# Patient Record
Sex: MALE | URBAN - METROPOLITAN AREA
[De-identification: names, ages, dates, MRNs, and addresses within clinical notes are randomized per-mention and may not be internally consistent; named-entity substitution may affect disease eponyms.]

---

## 2023-08-16 ENCOUNTER — APPOINTMENT (OUTPATIENT)
Dept: URBAN - METROPOLITAN AREA CLINIC 314 | Age: 28
Setting detail: DERMATOLOGY
End: 2023-08-16

## 2023-08-16 DIAGNOSIS — L26 EXFOLIATIVE DERMATITIS: ICD-10-CM

## 2023-08-16 DIAGNOSIS — L21.8 OTHER SEBORRHEIC DERMATITIS: ICD-10-CM

## 2023-08-16 DIAGNOSIS — K13.0 DISEASES OF LIPS: ICD-10-CM

## 2023-08-16 PROCEDURE — OTHER PRESCRIPTION MEDICATION MANAGEMENT: OTHER

## 2023-08-16 PROCEDURE — OTHER COUNSELING: OTHER

## 2023-08-16 PROCEDURE — 99203 OFFICE O/P NEW LOW 30 MIN: CPT

## 2023-08-16 PROCEDURE — OTHER PRESCRIPTION: OTHER

## 2023-08-16 RX ORDER — PIMECROLIMUS 10 MG/G
CREAM TOPICAL
Qty: 60 | Refills: 3 | Status: ERX | COMMUNITY
Start: 2023-08-16

## 2023-08-16 RX ORDER — KETOCONAZOLE 20 MG/G
CREAM TOPICAL BID
Qty: 30 | Refills: 3 | Status: ERX | COMMUNITY
Start: 2023-08-16

## 2023-08-16 ASSESSMENT — LOCATION DETAILED DESCRIPTION DERM
LOCATION DETAILED: LEFT PLANTAR FOREFOOT OVERLYING 3RD METATARSAL
LOCATION DETAILED: RIGHT ORAL COMMISSURE
LOCATION DETAILED: RIGHT MEDIAL MALAR CHEEK
LOCATION DETAILED: RIGHT MEDIAL PLANTAR MIDFOOT
LOCATION DETAILED: GLABELLA
LOCATION DETAILED: LEFT MEDIAL MALAR CHEEK
LOCATION DETAILED: LEFT ORAL COMMISSURE

## 2023-08-16 ASSESSMENT — LOCATION SIMPLE DESCRIPTION DERM
LOCATION SIMPLE: GLABELLA
LOCATION SIMPLE: RIGHT CHEEK
LOCATION SIMPLE: LEFT CHEEK
LOCATION SIMPLE: LEFT PLANTAR SURFACE
LOCATION SIMPLE: LEFT LIP
LOCATION SIMPLE: RIGHT LIP
LOCATION SIMPLE: RIGHT PLANTAR SURFACE

## 2023-08-16 ASSESSMENT — LOCATION ZONE DERM
LOCATION ZONE: LIP
LOCATION ZONE: FACE
LOCATION ZONE: FEET

## 2023-08-16 NOTE — HPI: RASH
What Type Of Note Output Would You Prefer (Optional)?: Bullet Format
How Severe Is Your Rash?: moderate
Is This A New Presentation, Or A Follow-Up?: Rash
Additional History: Patient was prescribed ketoconazole. It is helpful.
Additional History: Patient was prescribed desonide. He does find that his lips have been cracking and drying worse.

## 2023-08-16 NOTE — PROCEDURE: PRESCRIPTION MEDICATION MANAGEMENT
Render In Strict Bullet Format?: No
Detail Level: Zone
Plan: -\\n Discussed with patient there is no clear evidence of correlation with diet and condition but avoid pro-inflammatory foods. \\nPatient was counseled stress can be correlated with flares. Counseled patient on consideration of a non-steroidal prescription
Continue Regimen: Continue the following treatments: -\\nKetoconazole 2% shampoo\\nPlan: -\\nPatient was counseled shampoo can be used on scalp, ears, face and beard area. Recommended patient let product sit on skin for a minute before rinsing off.
Initiate Treatment: Elidel BID to rashy areas of face until clear
Discontinue Regimen: Desonide 0.5% ointment PRN flares (start: Elidel)
Initiate Treatment: -\\nDesonide 0.5% ointment (has Rx at home)\\n\\nMix w/ ketoconazole 2 % topical cream and apply BID PRN flares
Plan: -\\nPatient was counseled to stop oil serum and recommended gentle skin care. Patient was counseled on trying desonide on area first, if no improvement, an anti fungal topical can be used

## 2024-01-18 ENCOUNTER — APPOINTMENT (OUTPATIENT)
Dept: URBAN - METROPOLITAN AREA CLINIC 314 | Age: 29
Setting detail: DERMATOLOGY
End: 2024-02-05

## 2024-01-18 DIAGNOSIS — L73.1 PSEUDOFOLLICULITIS BARBAE: ICD-10-CM

## 2024-01-18 DIAGNOSIS — L72.8 OTHER FOLLICULAR CYSTS OF THE SKIN AND SUBCUTANEOUS TISSUE: ICD-10-CM

## 2024-01-18 PROCEDURE — OTHER PRESCRIPTION: OTHER

## 2024-01-18 PROCEDURE — 99214 OFFICE O/P EST MOD 30 MIN: CPT

## 2024-01-18 PROCEDURE — OTHER PRESCRIPTION MEDICATION MANAGEMENT: OTHER

## 2024-01-18 PROCEDURE — OTHER TREATMENT REGIMEN: OTHER

## 2024-01-18 PROCEDURE — OTHER ADDITIONAL NOTES: OTHER

## 2024-01-18 PROCEDURE — OTHER COUNSELING: OTHER

## 2024-01-18 PROCEDURE — OTHER MIPS QUALITY: OTHER

## 2024-01-18 RX ORDER — CLINDAMYCIN PHOSPHATE 10 MG/G
GEL TOPICAL QD
Qty: 30 | Refills: 3 | Status: ERX | COMMUNITY
Start: 2024-01-18

## 2024-01-18 ASSESSMENT — LOCATION DETAILED DESCRIPTION DERM
LOCATION DETAILED: RIGHT SUPERIOR LATERAL BUCCAL CHEEK
LOCATION DETAILED: LEFT CHIN
LOCATION DETAILED: LEFT SUPERIOR LATERAL BUCCAL CHEEK
LOCATION DETAILED: LEFT INFERIOR LATERAL NECK
LOCATION DETAILED: RIGHT SUPERIOR ANTERIOR NECK

## 2024-01-18 ASSESSMENT — LOCATION SIMPLE DESCRIPTION DERM
LOCATION SIMPLE: LEFT CHEEK
LOCATION SIMPLE: CHIN
LOCATION SIMPLE: RIGHT CHEEK
LOCATION SIMPLE: RIGHT ANTERIOR NECK
LOCATION SIMPLE: LEFT ANTERIOR NECK

## 2024-01-18 ASSESSMENT — LOCATION ZONE DERM
LOCATION ZONE: FACE
LOCATION ZONE: NECK

## 2024-01-18 NOTE — PROCEDURE: PRESCRIPTION MEDICATION MANAGEMENT
Detail Level: Zone
Plan: RTC if persistent/inadequate improvement w/ topicals
Initiate Treatment: BID Clindamycin to AA PRN flares
Render In Strict Bullet Format?: No

## 2024-01-18 NOTE — HPI: CYST
How Severe Is Your Cyst?: moderate
Is This A New Presentation, Or A Follow-Up?: Cyst
Additional History: Patient stated it was previously more enflamed.

## 2024-01-18 NOTE — PROCEDURE: ADDITIONAL NOTES
Render Risk Assessment In Note?: no
Detail Level: Simple
Additional Notes: Patient was counseled on intralesional injection versus topical antibiotic prescription.\\nDDX EIC
Additional Notes: Patient was counseled on laser hair removal, which can be considered as needed.\\nPatient was given laser referral.

## 2024-01-18 NOTE — PROCEDURE: TREATMENT REGIMEN
Otc Regimen: Aveeno Sensitive Skin Shave Gel\\nTanisa, Jake’s ingrown, or PFB Danyish chromabright (lactic/glycolic/sal acids)\\nReviewed recommendations for shaving practices\\nDiscussed consideration of retinoid or anti-inflammatory pending response w/ above
Detail Level: Zone

## 2024-01-18 NOTE — HPI: RASH
What Type Of Note Output Would You Prefer (Optional)?: Bullet Format
How Severe Is Your Rash?: moderate
Is This A New Presentation, Or A Follow-Up?: Rash
Additional History: Patient finds that this flaring occurs when he shaves.

## 2024-07-23 ENCOUNTER — RX ONLY (RX ONLY)
Age: 29
End: 2024-07-23

## 2024-07-23 RX ORDER — KETOCONAZOLE 20 MG/ML
SHAMPOO, SUSPENSION TOPICAL
Qty: 120 | Refills: 11 | Status: ERX | COMMUNITY
Start: 2024-07-23

## 2024-07-23 RX ORDER — KETOCONAZOLE 20 MG/G
CREAM TOPICAL BID
Qty: 30 | Refills: 3 | Status: ACTIVE